# Patient Record
Sex: FEMALE | Race: WHITE | NOT HISPANIC OR LATINO | URBAN - METROPOLITAN AREA
[De-identification: names, ages, dates, MRNs, and addresses within clinical notes are randomized per-mention and may not be internally consistent; named-entity substitution may affect disease eponyms.]

---

## 2018-03-12 ENCOUNTER — APPOINTMENT (OUTPATIENT)
Dept: URBAN - METROPOLITAN AREA CLINIC 186 | Age: 73
Setting detail: DERMATOLOGY
End: 2018-03-12

## 2018-03-12 DIAGNOSIS — L57.0 ACTINIC KERATOSIS: ICD-10-CM

## 2018-03-12 DIAGNOSIS — L81.4 OTHER MELANIN HYPERPIGMENTATION: ICD-10-CM

## 2018-03-12 DIAGNOSIS — I78.1 NEVUS, NON-NEOPLASTIC: ICD-10-CM

## 2018-03-12 DIAGNOSIS — D485 NEOPLASM OF UNCERTAIN BEHAVIOR OF SKIN: ICD-10-CM

## 2018-03-12 PROBLEM — I10 ESSENTIAL (PRIMARY) HYPERTENSION: Status: ACTIVE | Noted: 2018-03-12

## 2018-03-12 PROBLEM — D48.5 NEOPLASM OF UNCERTAIN BEHAVIOR OF SKIN: Status: ACTIVE | Noted: 2018-03-12

## 2018-03-12 PROBLEM — I25.10 ATHEROSCLEROTIC HEART DISEASE OF NATIVE CORONARY ARTERY WITHOUT ANGINA PECTORIS: Status: ACTIVE | Noted: 2018-03-12

## 2018-03-12 PROBLEM — E13.9 OTHER SPECIFIED DIABETES MELLITUS WITHOUT COMPLICATIONS: Status: ACTIVE | Noted: 2018-03-12

## 2018-03-12 PROCEDURE — OTHER BIOPSY BY SHAVE METHOD: OTHER

## 2018-03-12 PROCEDURE — OTHER COUNSELING: OTHER

## 2018-03-12 PROCEDURE — 11100: CPT

## 2018-03-12 PROCEDURE — OTHER REASSURANCE: OTHER

## 2018-03-12 PROCEDURE — 99213 OFFICE O/P EST LOW 20 MIN: CPT | Mod: 25

## 2018-03-12 ASSESSMENT — LOCATION ZONE DERM
LOCATION ZONE: ARM
LOCATION ZONE: FACE
LOCATION ZONE: NOSE

## 2018-03-12 ASSESSMENT — LOCATION DETAILED DESCRIPTION DERM
LOCATION DETAILED: LEFT INFERIOR MEDIAL MALAR CHEEK
LOCATION DETAILED: NASAL TIP
LOCATION DETAILED: LEFT INFERIOR MEDIAL FOREHEAD
LOCATION DETAILED: RIGHT SUPERIOR NASAL CHEEK
LOCATION DETAILED: LEFT ANTERIOR LATERAL PROXIMAL UPPER ARM

## 2018-03-12 ASSESSMENT — LOCATION SIMPLE DESCRIPTION DERM
LOCATION SIMPLE: NOSE
LOCATION SIMPLE: RIGHT CHEEK
LOCATION SIMPLE: LEFT FOREHEAD
LOCATION SIMPLE: LEFT CHEEK
LOCATION SIMPLE: LEFT UPPER ARM

## 2018-03-12 NOTE — PROCEDURE: BIOPSY BY SHAVE METHOD
X Size Of Lesion In Cm: 0
Cryotherapy Text: The wound bed was treated with cryotherapy after the biopsy was performed.
Dressing: bandage
Notification Instructions: Patient will be notified of biopsy results. However, patient instructed to call the office if not contacted within 2 weeks.
Wound Care: Petrolatum
Silver Nitrate Text: The wound bed was treated with silver nitrate after the biopsy was performed.
Biopsy Type: H and E
Billing Type: Third-Party Bill
Bill For Surgical Tray: no
Anesthesia Volume In Cc (Will Not Render If 0): 0.5
Biopsy Method: Personna blade
Anesthesia Type: 1% lidocaine with 1:100,000 epinephrine and a 1:10 solution of 8.4% sodium bicarbonate
Curettage Text: The wound bed was treated with curettage after the biopsy was performed.
Electrodesiccation Text: The wound bed was treated with electrodesiccation after the biopsy was performed.
Detail Level: Detailed
Render Post-Care Instructions In Note?: yes
Type Of Destruction Used: Curettage
Consent: Written consent was obtained and risks were reviewed including but not limited to scarring, infection, bleeding, scabbing, incomplete removal, nerve damage and allergy to anesthesia.
Electrodesiccation And Curettage Text: The wound bed was treated with electrodesiccation and curettage after the biopsy was performed.
Post-Care Instructions: I reviewed with the patient in detail post-care instructions. Patient is to keep the biopsy site dry overnight, and then apply Vaseline twice daily until healed.
Hemostasis: Aluminum Chloride

## 2019-11-07 ENCOUNTER — APPOINTMENT (OUTPATIENT)
Dept: URBAN - METROPOLITAN AREA CLINIC 186 | Age: 74
Setting detail: DERMATOLOGY
End: 2019-11-07

## 2019-11-07 DIAGNOSIS — L30.9 DERMATITIS, UNSPECIFIED: ICD-10-CM

## 2019-11-07 PROCEDURE — OTHER COUNSELING: OTHER

## 2019-11-07 PROCEDURE — OTHER DIAGNOSIS COMMENT: OTHER

## 2019-11-07 PROCEDURE — OTHER DEFER: OTHER

## 2019-11-07 PROCEDURE — 99213 OFFICE O/P EST LOW 20 MIN: CPT

## 2019-11-07 PROCEDURE — OTHER MIPS QUALITY: OTHER

## 2019-11-07 ASSESSMENT — LOCATION DETAILED DESCRIPTION DERM
LOCATION DETAILED: RIGHT INFERIOR MUCOSAL LIP
LOCATION DETAILED: RIGHT SUPERIOR MUCOSAL LIP
LOCATION DETAILED: LEFT INFERIOR MUCOSAL LIP

## 2019-11-07 ASSESSMENT — LOCATION SIMPLE DESCRIPTION DERM
LOCATION SIMPLE: RIGHT SUPERIOR MUCOSAL LIP
LOCATION SIMPLE: RIGHT INFERIOR MUCOSAL LIP
LOCATION SIMPLE: LEFT INFERIOR MUCOSAL LIP

## 2019-11-07 ASSESSMENT — LOCATION ZONE DERM: LOCATION ZONE: LIP

## 2019-11-07 NOTE — PROCEDURE: DIAGNOSIS COMMENT
Detail Level: Zone
Comment: States has had sores in mouth on and off for years.  Has seen dentist without any help or relief.  Discussed based on visit today. Ddx would include LP vs apthosis vs cicatrical pemphigoid.  Recommend biopsy to better delineate diagnosis, will plan on a punch.

## 2019-11-07 NOTE — PROCEDURE: DEFER
Introduction Text (Please End With A Colon): The following procedure was deferred: 4mm punch
Detail Level: Detailed

## 2019-11-14 ENCOUNTER — APPOINTMENT (OUTPATIENT)
Dept: URBAN - METROPOLITAN AREA CLINIC 186 | Age: 74
Setting detail: DERMATOLOGY
End: 2019-11-14

## 2019-11-14 DIAGNOSIS — L30.9 DERMATITIS, UNSPECIFIED: ICD-10-CM

## 2019-11-14 PROCEDURE — OTHER MIPS QUALITY: OTHER

## 2019-11-14 PROCEDURE — OTHER BIOPSY BY PUNCH METHOD: OTHER

## 2019-11-14 PROCEDURE — OTHER COUNSELING: OTHER

## 2019-11-14 PROCEDURE — 40490 BIOPSY OF LIP: CPT

## 2019-11-14 ASSESSMENT — LOCATION SIMPLE DESCRIPTION DERM
LOCATION SIMPLE: RIGHT INFERIOR MUCOSAL LIP
LOCATION SIMPLE: RIGHT SUPERIOR MUCOSAL LIP
LOCATION SIMPLE: LEFT INFERIOR MUCOSAL LIP
LOCATION SIMPLE: LEFT SUPERIOR LIP

## 2019-11-14 ASSESSMENT — LOCATION DETAILED DESCRIPTION DERM
LOCATION DETAILED: LEFT INFERIOR MUCOSAL LIP
LOCATION DETAILED: RIGHT INFERIOR MUCOSAL LIP
LOCATION DETAILED: LEFT SUPERIOR VERMILION LIP
LOCATION DETAILED: RIGHT SUPERIOR MUCOSAL LIP

## 2019-11-14 ASSESSMENT — LOCATION ZONE DERM: LOCATION ZONE: LIP

## 2019-11-14 NOTE — PROCEDURE: BIOPSY BY PUNCH METHOD
X Size Of Lesion In Cm (Optional): 0
Anesthesia Volume In Cc (Will Not Render If 0): 0.5
Bill 77999 For Specimen Handling/Conveyance To Laboratory?: no
Dressing: bandage
Post-Care Instructions: I reviewed with the patient in detail post-care instructions. Patient is to keep the biopsy site dry overnight, and then apply Vaseline twice daily until healed. Patient may apply hydrogen peroxide soaks to remove any crusting.
Was A Bandage Applied: Yes
Anesthesia Type: 1% lidocaine with 1:100,000 epinephrine and a 1:10 solution of 8.4% sodium bicarbonate
Biopsy Type: H and E
Home Suture Removal Text: Patient was provided a home suture removal kit and will remove their sutures at home.  If they have any questions or difficulties they will call the office.
Notification Instructions: Patient will be notified of biopsy results. However, patient instructed to call the office if not contacted within 2 weeks.
Wound Care: Petrolatum
Consent: Written consent was obtained and risks were reviewed including but not limited to scarring, infection, bleeding, scabbing, incomplete removal, nerve damage and allergy to anesthesia.
Punch Size In Mm: 4
Detail Level: Detailed
Suture Removal: 10 days
Epidermal Sutures: none, closed by secondary intention
Billing Type: Third-Party Bill
Hemostasis: Aluminum Chloride

## 2019-11-22 ENCOUNTER — RX ONLY (RX ONLY)
Age: 74
End: 2019-11-22

## 2020-06-05 ENCOUNTER — RX ONLY (RX ONLY)
Age: 75
End: 2020-06-05

## 2022-02-08 ENCOUNTER — APPOINTMENT (OUTPATIENT)
Dept: URBAN - METROPOLITAN AREA CLINIC 186 | Age: 77
Setting detail: DERMATOLOGY
End: 2022-02-08

## 2022-02-08 DIAGNOSIS — L20.84 INTRINSIC (ALLERGIC) ECZEMA: ICD-10-CM

## 2022-02-08 PROCEDURE — OTHER COUNSELING: OTHER

## 2022-02-08 PROCEDURE — 99214 OFFICE O/P EST MOD 30 MIN: CPT

## 2022-02-08 PROCEDURE — OTHER PRESCRIPTION: OTHER

## 2022-02-08 PROCEDURE — OTHER TREATMENT REGIMEN: OTHER

## 2022-02-08 RX ORDER — CLOBETASOL PROPIONATE 0.5 MG/G
CREAM TOPICAL
Qty: 60 | Refills: 3 | Status: ERX | COMMUNITY
Start: 2022-02-08

## 2022-02-08 ASSESSMENT — LOCATION DETAILED DESCRIPTION DERM
LOCATION DETAILED: LEFT POPLITEAL SKIN
LOCATION DETAILED: RIGHT POPLITEAL SKIN

## 2022-02-08 ASSESSMENT — LOCATION SIMPLE DESCRIPTION DERM
LOCATION SIMPLE: RIGHT POPLITEAL SKIN
LOCATION SIMPLE: LEFT POPLITEAL SKIN

## 2022-02-08 ASSESSMENT — LOCATION ZONE DERM: LOCATION ZONE: LEG

## 2022-02-08 NOTE — PROCEDURE: TREATMENT REGIMEN
Plan: Discussed that this appears to be eczema, which is a break down in the skin’s natural barrier. Discussed that eczema can be seasonal, or can be triggered by irritants, evironmental triggers. Recommended dry skin care with gentle products, such as Cetaphil cleanser and CeraVe moisturizer. Advised to avoid harsh soaps and moisturizers with preservatives, fragrances, and dyes as these products can strip the skin’s barrier and worsen eczema Other Instructions: Discussed that this appears to be eczema, which is a break down in the skin’s natural barrier. Discussed that eczema can be seasonal, or can be triggered by irritants, evironmental triggers. Recommended dry skin care with gentle products, such as Cetaphil cleanser and CeraVe moisturizer. Advised to avoid harsh soaps and moisturizers with preservatives, fragrances, and dyes as these products can strip the skin’s barrier and worsen eczema

## 2022-02-08 NOTE — HPI: RASH
What Type Of Note Output Would You Prefer (Optional)?: Bullet Format
How Severe Is Your Rash?: mild
Is This A New Presentation, Or A Follow-Up?: Rash
Additional History: Primary care physician gave cephalexin 500 mg. It did not help